# Patient Record
Sex: FEMALE | Race: WHITE | Employment: FULL TIME | URBAN - METROPOLITAN AREA
[De-identification: names, ages, dates, MRNs, and addresses within clinical notes are randomized per-mention and may not be internally consistent; named-entity substitution may affect disease eponyms.]

---

## 2019-06-21 ENCOUNTER — HOSPITAL ENCOUNTER (EMERGENCY)
Age: 24
Discharge: HOME | End: 2019-06-21
Attending: EMERGENCY MEDICINE
Payer: COMMERCIAL

## 2019-06-21 VITALS
RESPIRATION RATE: 15 BRPM | HEART RATE: 73 BPM | TEMPERATURE: 97.7 F | SYSTOLIC BLOOD PRESSURE: 117 MMHG | DIASTOLIC BLOOD PRESSURE: 78 MMHG | OXYGEN SATURATION: 99 %

## 2019-06-21 DIAGNOSIS — R55 VASOVAGAL SYNCOPE: ICD-10-CM

## 2019-06-21 DIAGNOSIS — E86.0 MILD DEHYDRATION: Primary | ICD-10-CM

## 2019-06-21 LAB
ANION GAP BLD CALC-SCNC: 17 MMOL/L (ref 10–20)
BUN BLD-MCNC: 27 MG/DL (ref 9–20)
CA-I BLD-MCNC: 1.14 MMOL/L (ref 1.12–1.32)
CHLORIDE BLD-SCNC: 103 MMOL/L (ref 98–107)
CO2 BLD-SCNC: 25 MMOL/L (ref 21–32)
CREAT BLD-MCNC: 0.8 MG/DL (ref 0.6–1.3)
GLUCOSE BLD-MCNC: 99 MG/DL (ref 65–100)
HCT VFR BLD CALC: 40 % (ref 35–47)
POTASSIUM BLD-SCNC: 4 MMOL/L (ref 3.5–5.1)
SERVICE CMNT-IMP: ABNORMAL
SODIUM BLD-SCNC: 140 MMOL/L (ref 136–145)

## 2019-06-21 PROCEDURE — 99285 EMERGENCY DEPT VISIT HI MDM: CPT

## 2019-06-21 PROCEDURE — 80047 BASIC METABLC PNL IONIZED CA: CPT

## 2019-06-21 PROCEDURE — 93005 ELECTROCARDIOGRAM TRACING: CPT

## 2019-06-22 NOTE — ED PROVIDER NOTES
20 y.o. female with no significant past medical history who presents from a concert via EMS with chief complaint of syncope. Pt reports she was at a concert when she began feeling lightheaded w/ nausea, blurry vision, and slight chest pain 1-2 hours ago. She states she then closed her eyes, then the next thing she remembers is being on the ground w/ paramedics. She was able to walk after the episode and she has not felt lightheaded since then. Pt states she may not have eaten enough food today and she drank ~20 oz water today. She notes she drank 1 glass of wine at lunch time, but she denies additional EtOH since then. Pt denies diaphoresis, vomiting, and palpitations. There are no other acute medical concerns at this time. PCP: Julio Stoll MD    Note written by Tim Lantigua, as dictated by Alyse Guerin MD 10:05 PM           History reviewed. No pertinent past medical history. History reviewed. No pertinent surgical history. History reviewed. No pertinent family history. Social History     Socioeconomic History    Marital status: SINGLE     Spouse name: Not on file    Number of children: Not on file    Years of education: Not on file    Highest education level: Not on file   Occupational History    Not on file   Social Needs    Financial resource strain: Not on file    Food insecurity:     Worry: Not on file     Inability: Not on file    Transportation needs:     Medical: Not on file     Non-medical: Not on file   Tobacco Use    Smoking status: Never Smoker    Smokeless tobacco: Never Used   Substance and Sexual Activity    Alcohol use:  Yes    Drug use: Never    Sexual activity: Not on file   Lifestyle    Physical activity:     Days per week: Not on file     Minutes per session: Not on file    Stress: Not on file   Relationships    Social connections:     Talks on phone: Not on file     Gets together: Not on file     Attends [employer] witness service: Not on file     Active member of club or organization: Not on file     Attends meetings of clubs or organizations: Not on file     Relationship status: Not on file    Intimate partner violence:     Fear of current or ex partner: Not on file     Emotionally abused: Not on file     Physically abused: Not on file     Forced sexual activity: Not on file   Other Topics Concern    Not on file   Social History Narrative    Not on file         ALLERGIES: Patient has no known allergies. Review of Systems   Constitutional: Negative for diaphoresis. Eyes: Positive for visual disturbance. Cardiovascular: Positive for chest pain. Negative for palpitations. Gastrointestinal: Positive for nausea. Negative for vomiting. Neurological: Positive for syncope and light-headedness. All other systems reviewed and are negative. Vitals:    06/21/19 2153   BP: 116/66   Pulse: 86   Resp: 17   Temp: 97.7 °F (36.5 °C)   SpO2: 98%            Physical Exam   Constitutional: She is oriented to person, place, and time. She appears well-developed and well-nourished. No distress. HENT:   Head: Normocephalic and atraumatic. Eyes: Pupils are equal, round, and reactive to light. Conjunctivae and EOM are normal.   Neck: Neck supple. Cardiovascular: Normal rate, regular rhythm and normal heart sounds. Pulmonary/Chest: Effort normal and breath sounds normal. No respiratory distress. Abdominal: She exhibits no distension. Musculoskeletal: Normal range of motion. She exhibits no deformity. Neurological: She is alert and oriented to person, place, and time. No cranial nerve deficit. Skin: Skin is warm and dry. Psychiatric: Her behavior is normal.   Nursing note and vitals reviewed. Note written by Tim Tabor, as dictated by Yessenia Lopez MD 10:05 PM    MDM     20 y.o. female presents with syncopal episode. Appears vagal in nature and possibly related to poor fluid intake today along with exertion at a concert.  No significant hematologic or metabolic abnormalities to explain symptoms. EKG reviewed by me is normal sinus rhythm and rate, without evidence of ST or T wave changes to suggest myocardial ischemia, no delta wave, no brugada, no prolonged QT to suggest arrhythmogenicity. Plan to follow up with PCP as needed and return precautions discussed for worsening or new concerning symptoms. Procedures      ED EKG interpretation:  Rhythm: normal sinus rhythm; and regular .  Rate (approx.): 88; Axis: normal; ST/T wave: no STEMI; normal QT  Note written by Tim Worrell, as dictated by Jordan Torre MD 10:05 PM

## 2019-06-22 NOTE — ED NOTES
Discharge instructions given to pt by RN in teach back method and verbalizes understanding. Opportunity for questions provided. Pt ambulatory out of unit, in no acute distress and taken home by parents.

## 2019-06-22 NOTE — ED TRIAGE NOTES
Pt arrives via EMS with c/o of a Syncope episode during an outdoor concert tonight. EMS reports pt passed out from a standing position and had a hr in the 40s upon their arrival. Pt reports tunnel vision prior to syncope and 4/10 cp after syncopal episode.     Reports having had a 6 oz glass of wine around lunch time but denies ETOH during concert.  Has not had dinner yet

## 2019-06-23 LAB
ATRIAL RATE: 88 BPM
CALCULATED P AXIS, ECG09: 46 DEGREES
CALCULATED R AXIS, ECG10: 31 DEGREES
CALCULATED T AXIS, ECG11: 23 DEGREES
DIAGNOSIS, 93000: NORMAL
P-R INTERVAL, ECG05: 182 MS
Q-T INTERVAL, ECG07: 358 MS
QRS DURATION, ECG06: 80 MS
QTC CALCULATION (BEZET), ECG08: 433 MS
VENTRICULAR RATE, ECG03: 88 BPM

## 2025-06-25 ENCOUNTER — OFFICE VISIT (OUTPATIENT)
Age: 30
End: 2025-06-25
Payer: COMMERCIAL

## 2025-06-25 VITALS — OXYGEN SATURATION: 98 % | HEART RATE: 80 BPM | DIASTOLIC BLOOD PRESSURE: 80 MMHG | SYSTOLIC BLOOD PRESSURE: 120 MMHG

## 2025-06-25 DIAGNOSIS — H60.8X3 CHRONIC ECZEMATOUS OTITIS EXTERNA OF BOTH EARS: Primary | ICD-10-CM

## 2025-06-25 DIAGNOSIS — H61.23 BILATERAL IMPACTED CERUMEN: ICD-10-CM

## 2025-06-25 PROCEDURE — 69210 REMOVE IMPACTED EAR WAX UNI: CPT | Performed by: OTOLARYNGOLOGY

## 2025-06-25 PROCEDURE — 99203 OFFICE O/P NEW LOW 30 MIN: CPT | Performed by: OTOLARYNGOLOGY

## 2025-06-25 RX ORDER — FLUOCINOLONE ACETONIDE 0.11 MG/ML
OIL AURICULAR (OTIC)
Qty: 20 ML | Refills: 1 | Status: SHIPPED | OUTPATIENT
Start: 2025-06-25

## 2025-06-25 ASSESSMENT — ENCOUNTER SYMPTOMS
EYE DISCHARGE: 0
COUGH: 0
APNEA: 0
NAUSEA: 0
SINUS PRESSURE: 0
SINUS PAIN: 0
EYE ITCHING: 0
BACK PAIN: 0
ABDOMINAL PAIN: 0
PHOTOPHOBIA: 0
TROUBLE SWALLOWING: 0
VOMITING: 0
VOICE CHANGE: 0
STRIDOR: 0
SHORTNESS OF BREATH: 0
WHEEZING: 0
SORE THROAT: 0
CHOKING: 0

## 2025-06-25 NOTE — PROGRESS NOTES
[facility]          Outpatient Clinic Note      Subjective:    Chuyita Brice   29 y.o.   1995     New Patient Visit    Chief Complaint   Patient presents with    New Patient    Cerumen Impaction      History of Present Illness:  6/25/25 - [facility]  28 yo female presents with ear problems.  She has a hx of outer ear infections, also cerumen impactions.  C/o chronic itching, no hearing complaints.  Has had swimmers ear needing wick in the past.    Review of Systems  Review of Systems   Constitutional:  Negative for appetite change, chills, fatigue and fever.   HENT:  Negative for congestion, ear discharge, ear pain, nosebleeds, postnasal drip, sinus pressure, sinus pain, sneezing, sore throat, tinnitus, trouble swallowing and voice change.    Eyes:  Negative for photophobia, discharge, itching and visual disturbance.   Respiratory:  Negative for apnea, cough, choking, shortness of breath, wheezing and stridor.    Cardiovascular:  Negative for chest pain and palpitations.   Gastrointestinal:  Negative for abdominal pain, nausea and vomiting.   Endocrine: Negative for cold intolerance and heat intolerance.   Genitourinary:  Negative for difficulty urinating and dysuria.   Musculoskeletal:  Negative for arthralgias, back pain, gait problem, myalgias, neck pain and neck stiffness.   Skin:  Negative for rash and wound.   Allergic/Immunologic: Negative for environmental allergies and food allergies.   Neurological:  Negative for dizziness, speech difficulty, light-headedness and headaches.   Hematological:  Negative for adenopathy. Does not bruise/bleed easily.   Psychiatric/Behavioral:  Negative for confusion and sleep disturbance. The patient is not nervous/anxious.            No past medical history on file.  No past surgical history on file.   Family History   Problem Relation Age of Onset    Diabetes Mother     Hypertension Mother     Ovarian Cancer Mother     Hypertension Father

## 2025-06-25 NOTE — PROGRESS NOTES
Identified pt with two pt identifiers(name and ). Reviewed record in preparation for visit and have obtained necessary documentation. All patient medications has been reviewed.  Chief Complaint   Patient presents with    New Patient    Puja Ruano       Vitals:    25 1314   BP: 120/80   Pulse: 80   SpO2: 98%                   Coordination of Care Questionnaire:   1) Have you been to an emergency room, urgent care, or hospitalized since your last visit?   no       2. Have seen or consulted any other health care provider since your last visit? no        Patient is accompanied by self I have received verbal consent from Chuyita Brice to discuss any/all medical information while they are present in the room.